# Patient Record
Sex: MALE | Race: OTHER
[De-identification: names, ages, dates, MRNs, and addresses within clinical notes are randomized per-mention and may not be internally consistent; named-entity substitution may affect disease eponyms.]

---

## 2020-11-21 ENCOUNTER — HOSPITAL ENCOUNTER (EMERGENCY)
Dept: HOSPITAL 61 - ER | Age: 45
Discharge: HOME | End: 2020-11-21
Payer: SELF-PAY

## 2020-11-21 VITALS — SYSTOLIC BLOOD PRESSURE: 158 MMHG | DIASTOLIC BLOOD PRESSURE: 97 MMHG

## 2020-11-21 VITALS — BODY MASS INDEX: 29.99 KG/M2 | WEIGHT: 152.78 LBS | HEIGHT: 60 IN

## 2020-11-21 DIAGNOSIS — L50.9: Primary | ICD-10-CM

## 2020-11-21 PROCEDURE — 99284 EMERGENCY DEPT VISIT MOD MDM: CPT

## 2020-11-21 NOTE — PHYS DOC
General Adult


EDM:


Chief Complaint:  SKIN RASH/ABSCESS





HPI:


HPI:





Patient is a 45  year old male who presents with states every time he eats 

seafood or eggs he breaks out in hives.  He states that he will take allergy 

medication and will eventually go away.  He ate seafood today and began breaking

out in a hives type rash 2 hours ago.  Patient has hives to his abdomen and, 

bilateral legs.  He denies swelling of the face, shortness of breath, itching 

throat, hives in the mouth, swelling of the tongue, wheezing, abdominal pain, 

nausea, vomiting.





Review of Systems:


Review of Systems:


Constitutional:   Denies fever or chills. []


Eyes:   Denies change in visual acuity. []


HENT:   Denies nasal congestion or sore throat. [] 


Respiratory:   Denies cough or shortness of breath. [] 


Cardiovascular:   Denies chest pain or edema. [] 


GI:   Denies abdominal pain, nausea, vomiting, bloody stools or diarrhea. [] 


:  Denies dysuria. [] 


Musculoskeletal:   Denies back pain or joint pain. [] 


Integument:   Denies rash. + Generalized hives [] 


Neurologic:   Denies headache, focal weakness or sensory changes. [] 


Endocrine:   Denies polyuria or polydipsia. [] 


Lymphatic:  Denies swollen glands. [] 


Psychiatric:  Denies depression or anxiety. []





Heart Score:


Risk Factors:


Risk Factors:  DM, Current or recent (<one month) smoker, HTN, HLP, family 

history of CAD, obesity.


Risk Scores:


Score 0 - 3:  2.5% MACE over next 6 weeks - Discharge Home


Score 4 - 6:  20.3% MACE over next 6 weeks - Admit for Clinical Observation


Score 7 - 10:  72.7% MACE over next 6 weeks - Early Invasive Strategies





Physical Exam:


PE:





Constitutional: Well developed, well nourished, no acute distress, non-toxic 

appearance. []


HENT: Normocephalic, atraumatic, bilateral external ears normal, oropharynx 

moist, no oral exudates, nose normal. []


Eyes: PERRLA, EOMI, conjunctiva normal, no discharge. [] 


Neck: Normal range of motion, no tenderness, supple, no stridor. [] 


Cardiovascular:Heart rate regular rhythm, no murmur []


Lungs & Thorax:  Bilateral breath sounds clear to auscultation []


Abdomen: Bowel sounds normal, soft, no tenderness, no masses, no pulsatile 

masses. [] 


Skin: Warm, dry, no erythema, hives rash to abdomen, bilateral legs. [] 


Back: No tenderness, no CVA tenderness. [] 


Extremities: No tenderness, no cyanosis, no clubbing, ROM intact, no edema. [] 


Neurologic: Alert and oriented X 3, normal motor function, normal sensory 

function, no focal deficits noted. []


Psychologic: Affect normal, judgement normal, mood normal. []





EKG:


EKG:


[]





Radiology/Procedures:


Radiology/Procedures:


[]





Course & Med Decision Making:


Course & Med Decision Making


Pertinent Labs and Imaging studies reviewed. (See chart for details)





See HPI.  Alert and oriented x4.  Ambulatory with a steady gait.  Speaks in full

 complete sentences.  No facial swelling, no swelling of the tongue, no hives in

 the mouth, no uvula swelling, uvula midline, no swelling of the lips, no rash 

to the face.  No rash to the neck.  Speaks in full complete sentences.  Lungs 

are clear to all station all lobes.  Vital signs within normal limits.  Patient 

is in no respiratory distress.  No wheezing.  Patient is given Benadryl, Pepcid,

 prednisone in the ED.  He will be sent home on a Medrol dose pack, Benadryl and

 Pepcid that he can take altogether for the next 3 to 4 days.





[]





Dragon Disclaimer:


Dragon Disclaimer:


This electronic medical record was generated, in whole or in part, using a voice

 recognition dictation system.





Departure


Departure


Impression:  


   Primary Impression:  


   Hives


Disposition:  01 DC HOME SELF CARE/HOMELESS


Condition:  STABLE


Patient Instructions:  Hives





Additional Instructions:  


Take the Benadryl and Medrol dose pack with the Pepcid as prescribed.  These all

 work together to take away the hives rash.  Do not eat any food that cause is 

you this rash. If at anytime you have trouble breathing or swelling of the face 

she need to come to the emergency room.


Scripts


Epinephrine (EPIPEN 2-SULTANA) 0.3 Mg/0.3 Ml Auto.injct


1 SYR IM ONCE for 1 Day, #1 PACKET 0 Refills


   USE ONLY FOR ANAPHYLAXIS


   Prov: ASH FOX APRN         11/21/20 


Methylprednisolone (MEDROL) 4 Mg Tab.ds.pk


1 PKG PO UD, #1 PKG


   Prov: ASH FOX         11/21/20 


Famotidine (PEPCID) 20 Mg Tablet


20 MG PO BID, #6 TAB


   Prov: ASH FOX         11/21/20 


Diphenhydramine Hcl (BENADRYL ALLERGY) 25 Mg Tablet


1 TAB PO TID for 3 Days, #9 TAB 0 Refills


   Prov: ASH FOX         11/21/20











ASH FOX            Nov 21, 2020 22:58

## 2021-07-05 ENCOUNTER — HOSPITAL ENCOUNTER (EMERGENCY)
Dept: HOSPITAL 61 - ER | Age: 46
Discharge: HOME | End: 2021-07-05
Payer: COMMERCIAL

## 2021-07-05 VITALS — DIASTOLIC BLOOD PRESSURE: 82 MMHG | SYSTOLIC BLOOD PRESSURE: 129 MMHG

## 2021-07-05 VITALS — WEIGHT: 120.15 LBS | HEIGHT: 63 IN | BODY MASS INDEX: 21.29 KG/M2

## 2021-07-05 DIAGNOSIS — F17.200: ICD-10-CM

## 2021-07-05 DIAGNOSIS — Z91.012: ICD-10-CM

## 2021-07-05 DIAGNOSIS — Z91.013: ICD-10-CM

## 2021-07-05 DIAGNOSIS — T78.40XA: Primary | ICD-10-CM

## 2021-07-05 PROCEDURE — 99285 EMERGENCY DEPT VISIT HI MDM: CPT

## 2021-07-05 PROCEDURE — 93005 ELECTROCARDIOGRAM TRACING: CPT

## 2021-07-05 NOTE — ED.ADGEN
Past Medical History


Past Medical History:  Other


Additional Past Medical Histor:  ALLERGIES


Past Surgical History:  Other


Additional Past Surgical Histo:  UNKNOWN


Smoking Status:  Current Every Day Smoker


Alcohol Use:  Occasionally





General Adult


EDM:


Chief Complaint:  ALLERGIES





HPI:


HPI:


Patient is a 46-year-old male with past medical history of anaphylaxis who 

presents to the emergency room complaining of lightheadedness and itching all 

over.  He states that he is not allowed to have any kind of seafood and is 

unsure whether or not he was exposed to anything.  He states that he woke up 

from sleep with a rash all over and itching.  He states shortly after that he 

developed dizziness.  He denies any shortness of breath, wheezing, swelling, 

nausea, vomiting.  He denies any, chest pain, numbness, weakness.





Review of Systems:


Review of Systems:


Complete ROS is negative unless otherwise documented in HPI





Current Medications:





Current Medications








 Medications


  (Trade)  Dose


 Ordered  Sig/Kel  Start Time


 Stop Time Status Last Admin


Dose Admin


 


 Dexamethasone


 Sodium Phosphate


  (Decadron)  10 mg  1X  ONCE  7/5/21 04:30


 7/5/21 04:31 DC 7/5/21 04:58


10 MG


 


 Diphenhydramine


 HCl


  (Benadryl)  25 mg  1X  ONCE  7/5/21 05:00


 7/5/21 05:01 DC 7/5/21 04:58


25 MG


 


 Famotidine


  (Pepcid Vial)  20 mg  1X  ONCE  7/5/21 04:30


 7/5/21 04:41 DC  





 


 Famotidine


  (Pepcid)  20 mg  1X  ONCE  7/5/21 05:00


 7/5/21 05:01 DC 7/5/21 04:57


20 MG











Allergies:


Allergies:





Allergies








Coded Allergies Type Severity Reaction Last Updated Verified


 


  Egg Derived Allergy Severe  11/21/20 Yes


 


  Fish Containing Products Allergy Severe  11/21/20 Yes


 


  shellfish derived Allergy Severe  11/21/20 Yes











Physical Exam:


PE:


General: Awake, alert, NAD. Well Nourished, well hydrated. Cooperative


HEENT: Atraumatic, EOMI, PERRL, airway patent, moist oral mucosa


Neck: Supple, trachea midline


Respiratory: CTA bilaterally, normal effort, no wheezing/crackles


CV: RRR, no murmur, cap refill <2


GI: Soft, nondistended, nontender, no masses


MSK: No obvious deformities


Skin: Warm, dry, hives to bilateral arms and abdomen


Neuro: A&O x3, speech NL, sensory and motor grossly intact, no focal deficits


Psych: Normal affect, normal mood, not suicidal or homicidal





Current Patient Data:


Vital Signs:





                                   Vital Signs








  Date Time  Temp Pulse Resp B/P (MAP) Pulse Ox O2 Delivery O2 Flow Rate FiO2


 


7/5/21 05:35  72 20 129/82 (98) 97 Room Air  


 


7/5/21 02:15 97.3       





 97.3       











EKG:


EKG:


[]





Heart Score:


C/O Chest Pain:  N/A


Risk Factors:


Risk Factors:  DM, Current or recent (<one month) smoker, HTN, HLP, family 

history of CAD, obesity.


Risk Scores:


Score 0 - 3:  2.5% MACE over next 6 weeks - Discharge Home


Score 4 - 6:  20.3% MACE over next 6 weeks - Admit for Clinical Observation


Score 7 - 10:  72.7% MACE over next 6 weeks - Early Invasive Strategies





Radiology/Procedures:


Radiology/Procedures:


[]





Course & Med Decision Making:


Course & Med Decision Making


Pertinent Labs and Imaging studies reviewed. (See chart for details)





Patient is a 46-year-old male who presents to the Emergency Room complaining of 

hives and lightheadedness. Patient's presentation is concerning for an allergic 

reaction. At this time, patient does not or signs of shock that will require 

epinephrine.  Patient will be treated with Decadron, Pepcid, Benadryl and 

observed here in the emergency room for any further symptoms.  EKG was done due 

to patient's lightheadedness.  EKG is normal.  He does not have any other 

associated symptoms that would suggest that this lightheadedness is due to a 

different pathology.  Patient's lightheadedness and hives completely resolved 

with medication.  On reevaluation, patient is back to baseline.


Patient's test results and vitals while in the ED were fully reviewed and 

discussed with the patient. Patient is stable and at this time does not need 

admission to the hospital. We have discussed strict return precautions and the 

importance of following up with their Primary Care Physician. Patient stated 

understanding and was given an opportunity to ask any questions. Patient is in 

agreement with plan.





Dragon Disclaimer:


Dragon Disclaimer:


This electronic medical record was generated, in whole or in part, using a voice

 recognition dictation system.





Departure


Departure


Impression:  


   Primary Impression:  


   Allergic reaction


Disposition:  01 HOME / SELF CARE / HOMELESS


Condition:  STABLE


Referrals:  


NO PCP (PCP)


Patient Instructions:  Anaphylactic Reaction


Scripts


Famotidine (PEPCID) 20 Mg Tablet


20 MG PO BID for 7 Days, #14 TAB


   Prov: MAIK MARIN MD         7/5/21 


Prednisone (PREDNISONE) 50 Mg Tablet


1 TAB PO DAILY, #5 TAB


   Prov: MAIK MARIN MD         7/5/21











MAIK MARIN MD             Jul 5, 2021 05:30

## 2021-07-05 NOTE — EKG
[facility]

              [address] [place] [address]

Test Date:    2021               Test Time:    05:04:25

Pat Name:     SHALONDA KIM                Department:   

Patient ID:   [id number]           Room:          

Gender:       M                        Technician:   

:          [id number]               Requested By: MAIK MARIN

Order Number: [id number]           Reading MD:   Steffen Fang

                                 Measurements

Intervals                              Axis          

Rate:         75                       P:            34

TX:           150                      QRS:          66

QRSD:         86                       T:            36

QT:           388                                    

QTc:          436                                    

                           Interpretive Statements

SINUS RHYTHM

Electronically Signed On 2021 12:00:49 CDT by Steffen Fang